# Patient Record
Sex: MALE | URBAN - METROPOLITAN AREA
[De-identification: names, ages, dates, MRNs, and addresses within clinical notes are randomized per-mention and may not be internally consistent; named-entity substitution may affect disease eponyms.]

---

## 2021-05-27 ENCOUNTER — OFFICE VISIT (OUTPATIENT)
Dept: URBAN - METROPOLITAN AREA CLINIC 72 | Facility: CLINIC | Age: 61
End: 2021-05-27
Payer: COMMERCIAL

## 2021-05-27 DIAGNOSIS — H25.13 AGE-RELATED NUCLEAR CATARACT, BILATERAL: ICD-10-CM

## 2021-05-27 DIAGNOSIS — H52.4 PRESBYOPIA: Primary | ICD-10-CM

## 2021-05-27 PROCEDURE — 92002 INTRM OPH EXAM NEW PATIENT: CPT | Performed by: OPTOMETRIST

## 2021-05-27 ASSESSMENT — INTRAOCULAR PRESSURE
OS: 15
OD: 14

## 2021-05-27 ASSESSMENT — VISUAL ACUITY
OS: 20/20
OD: 20/20

## 2022-05-27 ENCOUNTER — OFFICE VISIT (OUTPATIENT)
Dept: URBAN - METROPOLITAN AREA CLINIC 72 | Facility: CLINIC | Age: 62
End: 2022-05-27
Payer: COMMERCIAL

## 2022-05-27 DIAGNOSIS — H25.13 AGE-RELATED NUCLEAR CATARACT, BILATERAL: ICD-10-CM

## 2022-05-27 DIAGNOSIS — H52.4 PRESBYOPIA: Primary | ICD-10-CM

## 2022-05-27 PROCEDURE — 92014 COMPRE OPH EXAM EST PT 1/>: CPT | Performed by: OPTOMETRIST

## 2022-05-27 ASSESSMENT — VISUAL ACUITY
OS: 20/20
OD: 20/20

## 2022-05-27 ASSESSMENT — INTRAOCULAR PRESSURE
OS: 18
OD: 17

## 2023-05-30 ENCOUNTER — OFFICE VISIT (OUTPATIENT)
Dept: URBAN - METROPOLITAN AREA CLINIC 72 | Facility: CLINIC | Age: 63
End: 2023-05-30
Payer: COMMERCIAL

## 2023-05-30 DIAGNOSIS — H25.813 COMBINED FORMS OF AGE-RELATED CATARACT, BILATERAL: ICD-10-CM

## 2023-05-30 DIAGNOSIS — H52.4 PRESBYOPIA: Primary | ICD-10-CM

## 2023-05-30 PROCEDURE — 92014 COMPRE OPH EXAM EST PT 1/>: CPT | Performed by: OPTOMETRIST

## 2023-05-30 ASSESSMENT — INTRAOCULAR PRESSURE
OD: 10
OS: 10

## 2023-05-30 ASSESSMENT — VISUAL ACUITY
OS: 20/20
OD: 20/20

## 2024-01-10 NOTE — IMPRESSION/PLAN
Impression: Age-related nuclear cataract, bilateral: H25.13. Plan: Discussed signs and symptoms of cataract progression. Pt to RTC PRN in the event of changes to visual status. No recommendation for surgery at present time. Will continue to monitor. No

## 2024-05-31 ENCOUNTER — OFFICE VISIT (OUTPATIENT)
Dept: URBAN - METROPOLITAN AREA CLINIC 72 | Facility: CLINIC | Age: 64
End: 2024-05-31
Payer: COMMERCIAL

## 2024-05-31 DIAGNOSIS — H25.813 COMBINED FORMS OF AGE-RELATED CATARACT, BILATERAL: Primary | ICD-10-CM

## 2024-05-31 DIAGNOSIS — H04.123 DRY EYE SYNDROME OF BILATERAL LACRIMAL GLANDS: ICD-10-CM

## 2024-05-31 PROCEDURE — 99213 OFFICE O/P EST LOW 20 MIN: CPT | Performed by: OPTOMETRIST

## 2024-05-31 ASSESSMENT — INTRAOCULAR PRESSURE
OS: 18
OD: 17

## 2025-06-02 ENCOUNTER — OFFICE VISIT (OUTPATIENT)
Dept: URBAN - METROPOLITAN AREA CLINIC 72 | Facility: CLINIC | Age: 65
End: 2025-06-02
Payer: MEDICARE

## 2025-06-02 DIAGNOSIS — H43.823 VITREOMACULAR ADHESION, BILATERAL: Primary | ICD-10-CM

## 2025-06-02 DIAGNOSIS — H43.811 VITREOUS DEGENERATION, RIGHT EYE: ICD-10-CM

## 2025-06-02 DIAGNOSIS — H25.813 COMBINED FORMS OF AGE-RELATED CATARACT, BILATERAL: ICD-10-CM

## 2025-06-02 PROCEDURE — 92134 CPTRZ OPH DX IMG PST SGM RTA: CPT | Performed by: OPTOMETRIST

## 2025-06-02 PROCEDURE — 99213 OFFICE O/P EST LOW 20 MIN: CPT | Performed by: OPTOMETRIST

## 2025-06-02 ASSESSMENT — INTRAOCULAR PRESSURE
OD: 12
OS: 13